# Patient Record
Sex: FEMALE | Race: OTHER | HISPANIC OR LATINO | URBAN - METROPOLITAN AREA
[De-identification: names, ages, dates, MRNs, and addresses within clinical notes are randomized per-mention and may not be internally consistent; named-entity substitution may affect disease eponyms.]

---

## 2023-08-29 ENCOUNTER — EMERGENCY (EMERGENCY)
Facility: HOSPITAL | Age: 43
LOS: 1 days | Discharge: HOME | End: 2023-08-29
Attending: EMERGENCY MEDICINE | Admitting: EMERGENCY MEDICINE
Payer: OTHER MISCELLANEOUS

## 2023-08-29 VITALS
SYSTOLIC BLOOD PRESSURE: 112 MMHG | HEIGHT: 64 IN | TEMPERATURE: 98 F | RESPIRATION RATE: 15 BRPM | DIASTOLIC BLOOD PRESSURE: 87 MMHG | OXYGEN SATURATION: 99 % | WEIGHT: 176.37 LBS | HEART RATE: 73 BPM

## 2023-08-29 PROCEDURE — 99284 EMERGENCY DEPT VISIT MOD MDM: CPT

## 2023-08-29 RX ORDER — ACETAMINOPHEN 500 MG
975 TABLET ORAL ONCE
Refills: 0 | Status: COMPLETED | OUTPATIENT
Start: 2023-08-29 | End: 2023-08-29

## 2023-08-29 RX ORDER — IBUPROFEN 200 MG
1 TABLET ORAL
Qty: 20 | Refills: 0
Start: 2023-08-29

## 2023-08-29 RX ORDER — KETOROLAC TROMETHAMINE 30 MG/ML
30 SYRINGE (ML) INJECTION ONCE
Refills: 0 | Status: DISCONTINUED | OUTPATIENT
Start: 2023-08-29 | End: 2023-08-29

## 2023-08-29 RX ORDER — DIAZEPAM 5 MG
2 TABLET ORAL ONCE
Refills: 0 | Status: DISCONTINUED | OUTPATIENT
Start: 2023-08-29 | End: 2023-08-29

## 2023-08-29 RX ORDER — LIDOCAINE 4 G/100G
1 CREAM TOPICAL ONCE
Refills: 0 | Status: DISCONTINUED | OUTPATIENT
Start: 2023-08-29 | End: 2023-09-02

## 2023-08-29 RX ORDER — DIAZEPAM 5 MG
1 TABLET ORAL
Qty: 6 | Refills: 0
Start: 2023-08-29

## 2023-08-29 RX ADMIN — Medication 975 MILLIGRAM(S): at 14:18

## 2023-08-29 RX ADMIN — Medication 2 MILLIGRAM(S): at 14:18

## 2023-08-29 RX ADMIN — Medication 30 MILLIGRAM(S): at 14:19

## 2023-08-29 NOTE — ED PROVIDER NOTE - PATIENT PORTAL LINK FT
You can access the FollowMyHealth Patient Portal offered by [facility] by registering at the following website: [email or web address]. By joining [employer]’s FollowMyHealth portal, you will also be able to view your health information using other applications (apps) compatible with our system.

## 2023-08-29 NOTE — ED PROVIDER NOTE - PHYSICAL EXAMINATION
GEN: Patient awake alert NAD.   HEENT: normocephalic, atraumatic, moist MM  CARDIAC: RRR, S1, S2, no murmur.   PULM: CTA B/L no wheeze, rhonchi, rales.   ABD: soft NT, ND, no rebound no guarding  MSK: Moving all extremities, no edema. 5/5 strength and full ROM in all extremities, except for b/l lumbar paraspinal ttp.     NEURO: A&Ox3, no focal neurological deficits, no midline lumbar spinal ttp, normal sensation  SKIN: warm, dry, no rash.

## 2023-08-29 NOTE — ED PROVIDER NOTE - NSFOLLOWUPINSTRUCTIONS_ED_ALL_ED_FT
1. Jerrod un seguimiento con teran médico de atención primaria dentro de la próxima semana.   2. [place] Tylenol/Motrin para el dolor según sea necesario. Siga las instrucciones del embalaje.   3. Utilice parches de lidocaína que se pueden comprar sin receta cada 12 horas según sea necesario.  Dolor de espalda  El dolor de espalda es muy común en los adultos. La causa del dolor de espalda stan vez es peligrosa y el dolor suele mejorar con el tiempo. Es posible que se desconozca la causa de teran dolor de espalda y puede incluir distensión de músculos o ligamentos, degeneración de los discos espinales o artritis. Ocasionalmente, el dolor puede irradiarse hacia las piernas. Los medicamentos de venta snehal para reducir el dolor y la inflamación suelen ser los más útiles. Estirarse y permanecer activo con frecuencia ayuda al proceso de curación.   Descanse, no levante objetos pesados. Se recomienda caminar ligeramente y realizar actividad avanzada según se tolere.  Compresas tibias en el área.  BUSQUE ATENCIÓN MÉDICA INMEDIATA SI TIENE ALGUNO DE LOS SIGUIENTES SÍNTOMAS: problemas de control de los intestinos o la vejiga, debilidad inusual o entumecimiento en los brazos o las piernas, náuseas o vómitos, dolor abdominal, fiebre, mareos o aturdimiento.        1. Please follow-up with your primary care doctor within the next week.  2.  Please take Tylenol/Motrin for pain as needed.  Please follow instruction on packaging.  3.  Please use lidocaine patches can be bought over-the-counter every 12 hours as needed.    Back Pain    Back pain is very common in adults. The cause of back pain is rarely dangerous and the pain often gets better over time. The cause of your back pain may not be known and may include strain of muscles or ligaments, degeneration of the spinal disks, or arthritis. Occasionally the pain may radiate down your leg(s). Over-the-counter medicines to reduce pain and inflammation are often the most helpful. Stretching and remaining active frequently helps the healing process.       Rest, no heavy lifting.  Light walking encouraged, advanced activity as tolerated.    Warm compresses to area.     SEEK IMMEDIATE MEDICAL CARE IF YOU HAVE ANY OF THE FOLLOWING SYMPTOMS: bowel or bladder control problems, unusual weakness or numbness in your arms or legs, nausea or vomiting, abdominal pain, fever, dizziness/lightheadedness.

## 2023-08-29 NOTE — ED PROVIDER NOTE - OBJECTIVE STATEMENT
43-year-old female past medical history of hypothyroidism since the ED complaining of bilateral lower back pain after lifting a heavy box at work earlier today.  Denies radiation, saddle anesthesia, bowel or bladder dysfunction, fever, abdominal pain, chest pain, shortness of breath.    Patient primarily Thai-speaking with son providing translation bedside as per patient request.

## 2023-08-29 NOTE — ED PROVIDER NOTE - ATTENDING CONTRIBUTION TO CARE
Patient is a 43-year-old female who presents with bilateral lower back pain status post lifting heavy box while at work this morning.  Patient denies any radiation of pain to the lower extremities, denies any saddle anesthesia, denies any bowel or bladder incontinence or retention, denies fever/abdominal pain/chest pain/shortness of breath.  Review of systems as above.  Agree with resident exam as above.  Assessment and plan: Patient presents with low back pain, most likely of musculoskeletal origin; there is no midline ttp; no neurologic signs or symptoms on history or exam; no numbness/tingling or paresthesias; gait is normal, no bowel or bladder incontinence; no evidence of cord compression or cauda equina syndrome; in addition, there is no fever and no risk factors or evidence of epidural abscess; no evidence of cord transection or metastatic process or other acute spinal cord injury.  Patient has a normal neuro exam and back exam; no evidence of AAA/dissection; given pain meds and we will re-evaluate.

## 2023-08-29 NOTE — ED PROVIDER NOTE - CLINICAL SUMMARY MEDICAL DECISION MAKING FREE TEXT BOX
David Jovel, DO PGY-3: 43-year-old female past medical history of hypothyroidism since the ED complaining of bilateral lower back pain after lifting a heavy box at work earlier today.  Denies radiation, saddle anesthesia, bowel or bladder dysfunction, fever, abdominal pain, chest pain, shortness of breath. Patient with bilateral paraspinal lumbar tenderness, no midline lumbar tenderness, hemodynamically stable otherwise well-appearing.  Differential includes not limited to musculoskeletal pain.  Plan for symptomatic control reassess.

## 2023-09-01 DIAGNOSIS — M54.50 LOW BACK PAIN, UNSPECIFIED: ICD-10-CM
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
## 2023-09-01 DIAGNOSIS — E03.9 HYPOTHYROIDISM, UNSPECIFIED: ICD-10-CM
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
## 2025-03-02 NOTE — ED ADULT NURSE NOTE - NS ED NURSE DISCH DISPOSITION
Discharged tolerating po, feels better, ambulating without difficulty or dizziness, improved for d/c home, discussed emergent reasons to return. - Tiffanie Cerna MD (Attending)